# Patient Record
Sex: FEMALE | Race: WHITE | NOT HISPANIC OR LATINO | Employment: OTHER | URBAN - METROPOLITAN AREA
[De-identification: names, ages, dates, MRNs, and addresses within clinical notes are randomized per-mention and may not be internally consistent; named-entity substitution may affect disease eponyms.]

---

## 2018-02-19 ENCOUNTER — TELEPHONE (OUTPATIENT)
Dept: CARDIOLOGY | Facility: CLINIC | Age: 78
End: 2018-02-19

## 2018-02-19 NOTE — TELEPHONE ENCOUNTER
Patient's daughter Arabella called, informing me that her mother on occ has an episodes of chest tightness and she gets weak.  Patient did see her pcp and he said patient should see a cardiologist.  Arabella asked around at her work and was told that Dr. Langford is the best.  Can you please call Arabella.  Thanks!  Myra

## 2018-02-21 NOTE — TELEPHONE ENCOUNTER
Annika, I've left a message for Arabella. [facility] does not have a NP appt available until mid-late April. I do not have an exact date until I have r/s his March/April appts. If she is having problems I recommended she see another physician in the group. Please let me know. Thanks-amk

## 2018-02-21 NOTE — TELEPHONE ENCOUNTER
S/w pt's daughter. They live in [facility]. I offered them to see a provider in [place], which would be closer for them, and transferred the call to scheduling-dexter

## 2018-02-21 NOTE — TELEPHONE ENCOUNTER
02/21/18  4:20 PM  I spoke with patient's daughter and scheduled her to offered her an appt on Thursday, 3/8 at 1140 at EP with Dr. Cruz. Can someone schedule her in this appt?     Thanks!  Odette CARL RN

## 2018-03-07 RX ORDER — ASPIRIN 325 MG
325 TABLET ORAL DAILY
COMMUNITY

## 2018-03-08 ENCOUNTER — OFFICE VISIT (OUTPATIENT)
Dept: CARDIOLOGY | Facility: CLINIC | Age: 78
End: 2018-03-08

## 2018-03-08 VITALS
HEART RATE: 78 BPM | HEIGHT: 60 IN | DIASTOLIC BLOOD PRESSURE: 60 MMHG | WEIGHT: 234 LBS | BODY MASS INDEX: 45.94 KG/M2 | SYSTOLIC BLOOD PRESSURE: 98 MMHG

## 2018-03-08 DIAGNOSIS — R42 DIZZY SPELLS: ICD-10-CM

## 2018-03-08 DIAGNOSIS — R00.2 PALPITATIONS: Primary | ICD-10-CM

## 2018-03-08 PROCEDURE — 93000 ELECTROCARDIOGRAM COMPLETE: CPT | Performed by: INTERNAL MEDICINE

## 2018-03-08 PROCEDURE — 99204 OFFICE O/P NEW MOD 45 MIN: CPT | Performed by: INTERNAL MEDICINE

## 2018-03-08 NOTE — PROGRESS NOTES
PATIENTINFORMATION    Date of Office Visit: 2018  Encounter Provider: Zeina Cruz MD  Place of Service: [facility] CARDIOLOGY  Patient Name: Chantel Tyler  : 1940    Subjective:     Encounter Date:2018      Patient ID: Chantel Tyler is a 77 y.o. female.      History of Present Illness    This is a very nice lady who has been healthy her whole life.  In the last year she has had two severe episodes where she felt dizzy and then felt like her heart was racing.  They lasted about 5 minutes.  She has had multiple other shorter episodes where she feels dizzy like she is going to pass out, and then she feels her heart take off and race.  With the more minor ones, generally her heart only races for seconds.  The more severe episodes, her heart raced for minutes.  She does not feel dizzy the entire time her heart is racing, generally just at the beginning.  She denies chest pain or shortness of breath.  She is not active.  She does not exercise on a regular basis.  She has two cups of coffee per day.  She does eat a lot of sweet according to the family member who is with her.        Review of Systems   Constitution: Positive for malaise/fatigue. Negative for fever, weight gain and weight loss.   HENT: Negative for ear pain, hearing loss, nosebleeds and sore throat.    Eyes: Negative for double vision, pain, vision loss in left eye and vision loss in right eye.   Cardiovascular:        See history of present illness.   Respiratory: Positive for shortness of breath. Negative for cough, sleep disturbances due to breathing, snoring and wheezing.    Endocrine: Negative for cold intolerance, heat intolerance and polyuria.   Skin: Negative for itching, poor wound healing and rash.   Musculoskeletal: Negative for joint pain, joint swelling and myalgias.   Gastrointestinal: Negative for abdominal pain, diarrhea, hematochezia, nausea and vomiting.   Genitourinary: Negative for  "hematuria and hesitancy.   Neurological: Negative for numbness, paresthesias and seizures.   Psychiatric/Behavioral: Negative for depression. The patient is not nervous/anxious.            ECG 12 Lead  Date/Time: 3/8/2018 11:54 AM  Performed by: ANNABELLE ALCANTARA  Authorized by: ANNABELLE ALCANTARA   Previous ECG: no previous ECG available  Rhythm: sinus rhythm  BPM: 78  Conduction: conduction normal  ST Segments: ST segments normal  T Waves: T waves normal  Clinical impression: normal ECG               Objective:     BP 98/60 (BP Location: Left arm)  Pulse 78  Ht 152.4 cm (60\")  Wt 106 kg (234 lb)  BMI 45.7 kg/m2 Body mass index is 45.7 kg/(m^2).     Physical Exam   Constitutional: She appears well-developed.   HENT:   Head: Normocephalic and atraumatic.   Eyes: Conjunctivae and lids are normal. Pupils are equal, round, and reactive to light. Lids are everted and swept, no foreign bodies found.   Neck: Normal range of motion. No JVD present. Carotid bruit is not present. No tracheal deviation present. No thyroid mass present.   Cardiovascular: Normal rate, regular rhythm and normal heart sounds.    Pulses:       Dorsalis pedis pulses are 2+ on the right side, and 2+ on the left side.   Pulmonary/Chest: Effort normal and breath sounds normal.   Abdominal: Normal appearance and bowel sounds are normal.   Musculoskeletal: Normal range of motion.   Neurological: She is alert. She has normal strength.   Skin: Skin is warm, dry and intact.   Psychiatric: She has a normal mood and affect. Her behavior is normal.   Vitals reviewed.        Assessment/Plan:       She has palpitations and dizziness.  I am concerned about pauses and I am also concerned about atrial fibrillation.  I am going to start with a Zio patch.  If she does not have any symptoms and that is normal, we may need to do a longer term monitor.  I am concerned enough about her symptoms that after the Zio patch, a 30-day event monitor would be warranted.  "       Orders Placed This Encounter   Procedures   • Holter Monitor - 72 Hour Up To 21 Days     Standing Status:   Future     Standing Expiration Date:   3/8/2019     Order Specific Question:   Reason for exam?     Answer:   Palpitations     Order Specific Question:   Reason for exam?     Answer:   Dizziness   • ECG 12 Lead     This order was created via procedure documentation      Chantel Tyler   Home Medication Instructions [facility]:    Printed on:03/08/18 1970   Medication Information                      aspirin 325 MG tablet  Take 325 mg by mouth Daily.                        Zeina Cruz MD  03/08/18  11:58 AM

## 2018-04-17 ENCOUNTER — TELEPHONE (OUTPATIENT)
Dept: CARDIOLOGY | Facility: CLINIC | Age: 78
End: 2018-04-17

## 2018-04-17 NOTE — TELEPHONE ENCOUNTER
I tried calling twice but it sounded like a cell phone that was cutting out.  Can you see if there is another number I contrary to reach them on    She has SVT and this is likely what she has been feeling.  I think she should see Obi.  She will likely feel better with an ablation but I also need to start her on a beta blocker first.

## 2018-04-23 NOTE — TELEPHONE ENCOUNTER
Can you send a letter asking her to contact the office as soon as possible, the numbers we have are not allowing us to get into contact with her